# Patient Record
Sex: MALE | Race: WHITE | Employment: FULL TIME | URBAN - METROPOLITAN AREA
[De-identification: names, ages, dates, MRNs, and addresses within clinical notes are randomized per-mention and may not be internally consistent; named-entity substitution may affect disease eponyms.]

---

## 2018-11-01 PROBLEM — R06.81 APNEA: Status: ACTIVE | Noted: 2018-11-01

## 2018-11-01 PROBLEM — E78.00 HYPERCHOLESTEREMIA: Status: ACTIVE | Noted: 2018-11-01

## 2021-10-22 PROBLEM — G47.33 OSA ON CPAP: Status: ACTIVE | Noted: 2018-11-01

## 2021-10-22 PROBLEM — Z99.89 OSA ON CPAP: Status: ACTIVE | Noted: 2018-11-01

## 2022-07-26 ENCOUNTER — PATIENT MESSAGE (OUTPATIENT)
Dept: PULMONOLOGY | Age: 67
End: 2022-07-26

## 2022-07-26 NOTE — TELEPHONE ENCOUNTER
From: Erum Mendoza  To: Dr. Arndt Breath: 7/26/2022 11:01 AM EDT  Subject: Prescription for Supplies Only    Kindly note that my previous health insurance ended with the termination of my employment. I am now on Medicare A and B with a Lashell Bennett. [facility] will not provide CPAP supplies I need,e.g., disposable filters for my [employer]. Suzanne Golden takes Medicare and requested me to contact you to Rashdia Day at [phone] the following for:  Prescription for Supplies Only, my Chart Notes, and my Sleep Study. Please confirm receipt of this message and that you will attend to this.

## 2022-07-26 NOTE — TELEPHONE ENCOUNTER
Dr Bharath Billy is no longer with [facility] You will need to call their office and they will make arrangement for you. Sorry for the inconvenience. Laura Fonseca

## 2025-01-10 ENCOUNTER — ON CAMPUS - OUTPATIENT (OUTPATIENT)
Dept: URBAN - METROPOLITAN AREA HOSPITAL 51 | Facility: HOSPITAL | Age: 70
End: 2025-01-10
Payer: MEDICARE

## 2025-01-10 DIAGNOSIS — K64.8 OTHER HEMORRHOIDS: ICD-10-CM

## 2025-01-10 DIAGNOSIS — K57.33 DIVERTICULITIS OF LARGE INTESTINE WITHOUT PERFORATION OR ABS: ICD-10-CM

## 2025-01-10 PROCEDURE — 99214 OFFICE O/P EST MOD 30 MIN: CPT | Performed by: INTERNAL MEDICINE
